# Patient Record
(demographics unavailable — no encounter records)

---

## 2025-02-27 NOTE — REVIEW OF SYSTEMS
[Negative] : Allergic/Immunologic [Nausea: Grade 0] : Nausea: Grade 0 [Vomiting: Grade 0] : Vomiting: Grade 0 [Edema Limbs: Grade 0] : Edema Limbs: Grade 0  [Fatigue: Grade 0] : Fatigue: Grade 0 [Localized Edema: Grade 0] : Localized Edema: Grade 0  [Breast Pain: Grade 1 - Mild pain] : Breast Pain: Grade 1 - Mild pain [Facial Muscle Weakness: Grade 0] : Facial Muscle Weakness: Grade 0 [Headache: Grade 0] : Headache: Grade 0 [Cough: Grade 0] : Cough: Grade 0 [Pruritus: Grade 0] : Pruritus: Grade 0 [Skin Hyperpigmentation: Grade 0] : Skin Hyperpigmentation: Grade 0 [Dermatitis Radiation: Grade 0] : Dermatitis Radiation: Grade 0 [FreeTextEntry7] : Left breast

## 2025-02-27 NOTE — PHYSICAL EXAM
[Normal] : supple with no thyromegaly or masses appreciated [] : no respiratory distress [Exaggerated Use Of Accessory Muscles For Inspiration] : no accessory muscle use [Breast Appearance] : normal in appearance [Symmetric] : breasts are symmetric [Breast Palpation Mass] : no palpable masses [Musculoskeletal - Swelling] : no joint swelling [Nail Clubbing] : no clubbing  or cyanosis of the fingernails [No Focal Deficits] : no focal deficits [Sensation] : the sensory exam was normal to light touch and pinprick [Oriented To Time, Place, And Person] : oriented to person, place, and time [Not Anxious] : not anxious

## 2025-02-27 NOTE — HISTORY OF PRESENT ILLNESS
[FreeTextEntry1] : Ms. ALANIZ is a 78-year-old, with pT1aN0 left breast invasive ductal carcinoma s/p lumpectomy with negative margins and negative SLNB who presents today for radiation therapy recommendations.   1/23/25 Breast, LEFT lateral, lumpectomy pathology showed: 1.  Breast, left lateral, lumpectomy: - Invasive moderately differentiated ductal carcinoma - [place] score 6/9 (3+2+1) - Invasive carcinoma measures 4.5 mm - Ductal carcinoma in situ, cribriform, papillary, and solid-papillary with intermediate grade nuclear atypia - DCIS spans 37 mm - Margins: - Invasive carcinoma is present <1 mm from the inked unoriented tissue edge - DCIS is present <1 mm from the inked unoriented tissue edge in multiple foci - For final margins see parts 3-7 - Calcification in DCIS and vessels - Uninvolved breast tissue shows focal atypical lobular hyperplasia - Lymphovascular permeation by tumor not seen - Biopsy site change - ER/ME/HER2 will be performed and reported in an addendum 2.  Breast, left medial, lumpectomy: - Ductal carcinoma in situ, similar to that in part 1 - DCIS spans 18 mm (six consecutive tissue slices) - Margins: - DCIS is present <1 mm from the inked unoriented tissue edge - For final margins see parts 3-7 - Calcifications present in DCIS - Biopsy site change ***Addendum ESTROGEN RECEPTOR (ER): Positive, >90%,  PROGESTERONE RECEPTOR (PgR): Positive, 50-60%,   2/21/25 Patient presents today for radiation therapy recommendations. Patient accompanied by daughter; she prefers her to translate. Patient reports intermittent LEFT breast discomfort. Mild redness around nipple area. Lumpectomy incision site is healing well. Patient follows up with GYN/ONC Dr. Vance last seen on 12/13/24.

## 2025-02-27 NOTE — HISTORY OF PRESENT ILLNESS
[FreeTextEntry1] : Ms. ALANIZ is a 78-year-old, with pT1aN0 left breast invasive ductal carcinoma s/p lumpectomy with negative margins and negative SLNB who presents today for radiation therapy recommendations.   1/23/25 Breast, LEFT lateral, lumpectomy pathology showed: 1.  Breast, left lateral, lumpectomy: - Invasive moderately differentiated ductal carcinoma - [place] score 6/9 (3+2+1) - Invasive carcinoma measures 4.5 mm - Ductal carcinoma in situ, cribriform, papillary, and solid-papillary with intermediate grade nuclear atypia - DCIS spans 37 mm - Margins: - Invasive carcinoma is present <1 mm from the inked unoriented tissue edge - DCIS is present <1 mm from the inked unoriented tissue edge in multiple foci - For final margins see parts 3-7 - Calcification in DCIS and vessels - Uninvolved breast tissue shows focal atypical lobular hyperplasia - Lymphovascular permeation by tumor not seen - Biopsy site change - ER/MO/HER2 will be performed and reported in an addendum 2.  Breast, left medial, lumpectomy: - Ductal carcinoma in situ, similar to that in part 1 - DCIS spans 18 mm (six consecutive tissue slices) - Margins: - DCIS is present <1 mm from the inked unoriented tissue edge - For final margins see parts 3-7 - Calcifications present in DCIS - Biopsy site change ***Addendum ESTROGEN RECEPTOR (ER): Positive, >90%,  PROGESTERONE RECEPTOR (PgR): Positive, 50-60%,   2/21/25 Patient presents today for radiation therapy recommendations. Patient accompanied by daughter; she prefers her to translate. Patient reports intermittent LEFT breast discomfort. Mild redness around nipple area. Lumpectomy incision site is healing well. Patient follows up with GYN/ONC Dr. Vance last seen on 12/13/24.

## 2025-04-03 NOTE — VITALS
[Maximal Pain Intensity: 2/10] : 2/10 [Least Pain Intensity: 0/10] : 0/10 [OTC] : OTC [80: Normal activity with effort; some signs or symptoms of disease.] : 80: Normal activity with effort; some signs or symptoms of disease.  [2 - Distress Level] : Distress Level: 2

## 2025-04-08 NOTE — DISEASE MANAGEMENT
[Pathological] : TNM Stage: p [I] : I [TTNM] : 1a [NTNM] : 0 [MTNM] : 0 [de-identified] : 26 Gy [de-identified] : left breast

## 2025-04-08 NOTE — HISTORY OF PRESENT ILLNESS
[FreeTextEntry1] : Ms. ALANIZ is a 78-year-old, with pT1aN0 left breast invasive ductal carcinoma s/p lumpectomy with negative margins and negative SLNB who presents today for radiation therapy recommendations.   1/23/25 Breast, LEFT lateral, lumpectomy pathology showed: 1.  Breast, left lateral, lumpectomy: - Invasive moderately differentiated ductal carcinoma - [place] score 6/9 (3+2+1) - Invasive carcinoma measures 4.5 mm - Ductal carcinoma in situ, cribriform, papillary, and solid-papillary with intermediate grade nuclear atypia - DCIS spans 37 mm - Margins: - Invasive carcinoma is present <1 mm from the inked unoriented tissue edge - DCIS is present <1 mm from the inked unoriented tissue edge in multiple foci - For final margins see parts 3-7 - Calcification in DCIS and vessels - Uninvolved breast tissue shows focal atypical lobular hyperplasia - Lymphovascular permeation by tumor not seen - Biopsy site change - ER/RI/HER2 will be performed and reported in an addendum 2.  Breast, left medial, lumpectomy: - Ductal carcinoma in situ, similar to that in part 1 - DCIS spans 18 mm (six consecutive tissue slices) - Margins: - DCIS is present <1 mm from the inked unoriented tissue edge - For final margins see parts 3-7 - Calcifications present in DCIS - Biopsy site change ***Addendum: ESTROGEN RECEPTOR (ER): Positive, >90%,  PROGESTERONE RECEPTOR (PgR): Positive, 50-60%,   2/21/25 Patient presents today for radiation therapy recommendations. Patient accompanied by daughter; she prefers her to translate. Patient reports intermittent LEFT breast discomfort. Mild redness around nipple area. Lumpectomy incision site is healing well. Patient follows up with GYN/ONC Dr. Vance last seen on 12/13/24.  4/3/2025 OTV. 1/5 Fx of RT to left breast was completed. No pain.

## 2025-04-08 NOTE — DISEASE MANAGEMENT
[Pathological] : TNM Stage: p [I] : I [TTNM] : 1a [NTNM] : 0 [MTNM] : 0 [de-identified] : 26 Gy [de-identified] : left breast

## 2025-04-08 NOTE — HISTORY OF PRESENT ILLNESS
[NI] : Normal [de-identified] : FreeTextEntry1] : Ms. ALANIZ is a 78-year-old, with pT1aN0 left breast invasive ductal carcinoma s/p lumpectomy with negative margins and negative SLNB who presents today for radiation therapy recommendations.   1/23/25 Breast, LEFT lateral, lumpectomy pathology showed: 1.  Breast, left lateral, lumpectomy: - Invasive moderately differentiated ductal carcinoma - [place] score 6/9 (3+2+1) - Invasive carcinoma measures 4.5 mm - Ductal carcinoma in situ, cribriform, papillary, and solid-papillary with intermediate grade nuclear atypia - DCIS spans 37 mm - Margins: - Invasive carcinoma is present <1 mm from the inked unoriented tissue edge - DCIS is present <1 mm from the inked unoriented tissue edge in multiple foci - For final margins see parts 3-7 - Calcification in DCIS and vessels - Uninvolved breast tissue shows focal atypical lobular hyperplasia - Lymphovascular permeation by tumor not seen - Biopsy site change - ER/AL/HER2 will be performed and reported in an addendum 2.  Breast, left medial, lumpectomy: - Ductal carcinoma in situ, similar to that in part 1 - DCIS spans 18 mm (six consecutive tissue slices) - Margins: - DCIS is present <1 mm from the inked unoriented tissue edge - For final margins see parts 3-7 - Calcifications present in DCIS - Biopsy site change ***Addendum: ESTROGEN RECEPTOR (ER): Positive, >90%,  PROGESTERONE RECEPTOR (PgR): Positive, 50-60%,   2/21/25 Patient presents today for radiation therapy recommendations. Patient accompanied by daughter; she prefers her to translate. Patient reports intermittent LEFT breast discomfort. Mild redness around nipple area. Lumpectomy incision site is healing well. Patient follows up with GYN/ONC Dr. Vance last seen on 12/13/24.  4/3/2025 OTV. 1/5 Fx of RT to left breast was completed. No pain.

## 2025-04-16 NOTE — DISEASE MANAGEMENT
[Pathological] : TNM Stage: p [I] : I [TTNM] : 1a [NTNM] : 0 [MTNM] : 0 [de-identified] : 26 Gy [de-identified] : left breast

## 2025-04-16 NOTE — HISTORY OF PRESENT ILLNESS
[FreeTextEntry1] : Ms. ALANIZ is a 78-year-old, with pT1aN0 left breast invasive ductal carcinoma s/p lumpectomy with negative margins and negative SLNB who presents today for radiation therapy recommendations.   1/23/25 Breast, LEFT lateral, lumpectomy pathology showed: 1.  Breast, left lateral, lumpectomy: - Invasive moderately differentiated ductal carcinoma - [place] score 6/9 (3+2+1) - Invasive carcinoma measures 4.5 mm - Ductal carcinoma in situ, cribriform, papillary, and solid-papillary with intermediate grade nuclear atypia - DCIS spans 37 mm - Margins: - Invasive carcinoma is present <1 mm from the inked unoriented tissue edge - DCIS is present <1 mm from the inked unoriented tissue edge in multiple foci - For final margins see parts 3-7 - Calcification in DCIS and vessels - Uninvolved breast tissue shows focal atypical lobular hyperplasia - Lymphovascular permeation by tumor not seen - Biopsy site change - ER/AL/HER2 will be performed and reported in an addendum 2.  Breast, left medial, lumpectomy: - Ductal carcinoma in situ, similar to that in part 1 - DCIS spans 18 mm (six consecutive tissue slices) - Margins: - DCIS is present <1 mm from the inked unoriented tissue edge - For final margins see parts 3-7 - Calcifications present in DCIS - Biopsy site change ***Addendum: ESTROGEN RECEPTOR (ER): Positive, >90%,  PROGESTERONE RECEPTOR (PgR): Positive, 50-60%,   2/21/25 Patient presents today for radiation therapy recommendations. Patient accompanied by daughter; she prefers her to translate. Patient reports intermittent LEFT breast discomfort. Mild redness around nipple area. Lumpectomy incision site is healing well. Patient follows up with GYN/ONC Dr. Vance last seen on 12/13/24.  4/9/2025 OTV. 5/5 Fx of RT to left breast was completed. c/o minor skin toxicity over RT area.

## 2025-04-16 NOTE — REASON FOR VISIT
[Routine On-Treatment] : a routine on-treatment visit for [Breast Cancer] : breast cancer [[employer] ] : provided by [employer]   [Time Spent: ____ minutes] : Total time spent using  services: [unfilled] minutes. The patient's primary language is not English thus required  services. [Interpreters_IDNumber] : [id number] [TWNoteComboBox1] : Indonesian

## 2025-04-16 NOTE — REVIEW OF SYSTEMS
[Negative] : Allergic/Immunologic [Nausea: Grade 0] : Nausea: Grade 0 [Vomiting: Grade 0] : Vomiting: Grade 0 [Edema Limbs: Grade 0] : Edema Limbs: Grade 0  [Fatigue: Grade 0] : Fatigue: Grade 0 [Localized Edema: Grade 0] : Localized Edema: Grade 0  [Breast Pain: Grade 1 - Mild pain] : Breast Pain: Grade 1 - Mild pain [Facial Muscle Weakness: Grade 0] : Facial Muscle Weakness: Grade 0 [Headache: Grade 0] : Headache: Grade 0 [Cough: Grade 0] : Cough: Grade 0 [Pruritus: Grade 0] : Pruritus: Grade 0 [Skin Hyperpigmentation: Grade 1 - Hyperpigmentation covering <10% BSA; no psychosocial impact] : Skin Hyperpigmentation: Grade 1 - Hyperpigmentation covering <10% BSA; no psychosocial impact [Dermatitis Radiation: Grade 1 - Faint erythema or dry desquamation] : Dermatitis Radiation: Grade 1 - Faint erythema or dry desquamation [FreeTextEntry7] : Left breast

## 2025-04-16 NOTE — HISTORY OF PRESENT ILLNESS
[FreeTextEntry1] : Ms. ALANIZ is a 78-year-old, with pT1aN0 left breast invasive ductal carcinoma s/p lumpectomy with negative margins and negative SLNB who presents today for radiation therapy recommendations.   1/23/25 Breast, LEFT lateral, lumpectomy pathology showed: 1.  Breast, left lateral, lumpectomy: - Invasive moderately differentiated ductal carcinoma - [place] score 6/9 (3+2+1) - Invasive carcinoma measures 4.5 mm - Ductal carcinoma in situ, cribriform, papillary, and solid-papillary with intermediate grade nuclear atypia - DCIS spans 37 mm - Margins: - Invasive carcinoma is present <1 mm from the inked unoriented tissue edge - DCIS is present <1 mm from the inked unoriented tissue edge in multiple foci - For final margins see parts 3-7 - Calcification in DCIS and vessels - Uninvolved breast tissue shows focal atypical lobular hyperplasia - Lymphovascular permeation by tumor not seen - Biopsy site change - ER/ME/HER2 will be performed and reported in an addendum 2.  Breast, left medial, lumpectomy: - Ductal carcinoma in situ, similar to that in part 1 - DCIS spans 18 mm (six consecutive tissue slices) - Margins: - DCIS is present <1 mm from the inked unoriented tissue edge - For final margins see parts 3-7 - Calcifications present in DCIS - Biopsy site change ***Addendum: ESTROGEN RECEPTOR (ER): Positive, >90%,  PROGESTERONE RECEPTOR (PgR): Positive, 50-60%,   2/21/25 Patient presents today for radiation therapy recommendations. Patient accompanied by daughter; she prefers her to translate. Patient reports intermittent LEFT breast discomfort. Mild redness around nipple area. Lumpectomy incision site is healing well. Patient follows up with GYN/ONC Dr. Vance last seen on 12/13/24.  4/9/2025 OTV. 5/5 Fx of RT to left breast was completed. c/o minor skin toxicity over RT area.

## 2025-04-16 NOTE — DISEASE MANAGEMENT
[Pathological] : TNM Stage: p [I] : I [TTNM] : 1a [NTNM] : 0 [MTNM] : 0 [de-identified] : 26 Gy [de-identified] : left breast

## 2025-04-16 NOTE — REASON FOR VISIT
[Routine On-Treatment] : a routine on-treatment visit for [Breast Cancer] : breast cancer [Language [employer] ] : provided by [employer]   [Time Spent: ____ minutes] : Total time spent using  services: [unfilled] minutes. The patient's primary language is not English thus required  services. [Interpreters_IDNumber] : [id number] [TWNoteComboBox1] : Japanese